# Patient Record
Sex: MALE | Race: WHITE | NOT HISPANIC OR LATINO | Employment: FULL TIME | URBAN - METROPOLITAN AREA
[De-identification: names, ages, dates, MRNs, and addresses within clinical notes are randomized per-mention and may not be internally consistent; named-entity substitution may affect disease eponyms.]

---

## 2020-11-25 ENCOUNTER — TRANSCRIBE ORDERS (OUTPATIENT)
Dept: ADMINISTRATIVE | Facility: HOSPITAL | Age: 40
End: 2020-11-25

## 2020-11-25 DIAGNOSIS — R74.8 ABNORMAL LEVELS OF OTHER SERUM ENZYMES: Primary | ICD-10-CM

## 2020-11-25 DIAGNOSIS — I10 HYPERTENSION: ICD-10-CM

## 2020-12-09 ENCOUNTER — HOSPITAL ENCOUNTER (OUTPATIENT)
Dept: RADIOLOGY | Facility: HOSPITAL | Age: 40
Discharge: HOME | End: 2020-12-09
Attending: INTERNAL MEDICINE
Payer: COMMERCIAL

## 2020-12-09 DIAGNOSIS — R74.8 ABNORMAL LEVELS OF OTHER SERUM ENZYMES: ICD-10-CM

## 2020-12-09 DIAGNOSIS — I10 HYPERTENSION: ICD-10-CM

## 2020-12-09 PROCEDURE — 76700 US EXAM ABDOM COMPLETE: CPT

## 2021-03-20 PROBLEM — E78.5 HYPERLIPIDEMIA: Status: ACTIVE | Noted: 2021-03-20

## 2021-03-20 PROBLEM — I10 HTN (HYPERTENSION): Status: ACTIVE | Noted: 2021-03-20

## 2021-03-20 PROBLEM — R07.9 CHEST PAIN: Status: ACTIVE | Noted: 2021-03-20

## 2021-03-20 PROBLEM — R74.8 ELEVATED LIVER ENZYMES: Status: ACTIVE | Noted: 2021-03-20

## 2021-03-20 PROBLEM — K21.9 GE REFLUX: Status: ACTIVE | Noted: 2021-03-20

## 2021-03-20 PROBLEM — R06.83 LOUD SNORING: Status: ACTIVE | Noted: 2021-03-20

## 2021-07-22 ENCOUNTER — OFFICE VISIT (OUTPATIENT)
Dept: GASTROENTEROLOGY | Facility: CLINIC | Age: 41
End: 2021-07-22
Payer: COMMERCIAL

## 2021-07-22 VITALS
HEIGHT: 71 IN | BODY MASS INDEX: 28.56 KG/M2 | WEIGHT: 204 LBS | DIASTOLIC BLOOD PRESSURE: 87 MMHG | HEART RATE: 59 BPM | SYSTOLIC BLOOD PRESSURE: 136 MMHG

## 2021-07-22 DIAGNOSIS — K76.0 FATTY LIVER: ICD-10-CM

## 2021-07-22 DIAGNOSIS — Z83.71 FAMILY HISTORY OF COLONIC POLYPS: Primary | ICD-10-CM

## 2021-07-22 DIAGNOSIS — K80.20 GALLSTONES: ICD-10-CM

## 2021-07-22 DIAGNOSIS — K21.9 GASTROESOPHAGEAL REFLUX DISEASE WITHOUT ESOPHAGITIS: ICD-10-CM

## 2021-07-22 DIAGNOSIS — Z80.0 FAMILY HISTORY OF COLON CANCER: ICD-10-CM

## 2021-07-22 PROCEDURE — 99204 OFFICE O/P NEW MOD 45 MIN: CPT | Performed by: INTERNAL MEDICINE
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
## 2021-10-08 ENCOUNTER — OFFICE VISIT (OUTPATIENT)
Dept: INTERNAL MEDICINE CLINIC | Facility: CLINIC | Age: 41
End: 2021-10-08
Payer: COMMERCIAL

## 2021-10-08 VITALS
HEART RATE: 81 BPM | TEMPERATURE: 98.3 F | SYSTOLIC BLOOD PRESSURE: 122 MMHG | WEIGHT: 203 LBS | DIASTOLIC BLOOD PRESSURE: 80 MMHG | BODY MASS INDEX: 28.31 KG/M2 | OXYGEN SATURATION: 97 %

## 2021-10-08 DIAGNOSIS — R53.83 OTHER FATIGUE: ICD-10-CM

## 2021-10-08 DIAGNOSIS — K80.20 GALLSTONES: ICD-10-CM

## 2021-10-08 DIAGNOSIS — K21.9 GASTROESOPHAGEAL REFLUX DISEASE WITHOUT ESOPHAGITIS: ICD-10-CM

## 2021-10-08 DIAGNOSIS — R06.83 LOUD SNORING: ICD-10-CM

## 2021-10-08 DIAGNOSIS — Z00.00 ANNUAL PHYSICAL EXAM: Primary | ICD-10-CM

## 2021-10-08 DIAGNOSIS — E78.2 MIXED HYPERLIPIDEMIA: ICD-10-CM

## 2021-10-08 DIAGNOSIS — R74.8 ELEVATED LIVER ENZYMES: ICD-10-CM

## 2021-10-08 PROCEDURE — 99213 OFFICE O/P EST LOW 20 MIN: CPT | Performed by: INTERNAL MEDICINE

## 2021-10-08 PROCEDURE — 3725F SCREEN DEPRESSION PERFORMED: CPT | Performed by: INTERNAL MEDICINE

## 2021-10-08 PROCEDURE — 99396 PREV VISIT EST AGE 40-64: CPT | Performed by: INTERNAL MEDICINE

## 2021-10-08 PROCEDURE — 1036F TOBACCO NON-USER: CPT | Performed by: INTERNAL MEDICINE

## 2021-10-08 RX ORDER — OMEGA-3-ACID ETHYL ESTERS 1 G/1
1 CAPSULE, LIQUID FILLED ORAL 2 TIMES DAILY
COMMUNITY

## 2021-10-08 RX ORDER — MELATONIN
1000 DAILY
COMMUNITY

## 2021-11-17 ENCOUNTER — TELEPHONE (OUTPATIENT)
Dept: GASTROENTEROLOGY | Facility: CLINIC | Age: 41
End: 2021-11-17

## 2022-03-08 ENCOUNTER — OFFICE VISIT (OUTPATIENT)
Dept: INTERNAL MEDICINE CLINIC | Facility: CLINIC | Age: 42
End: 2022-03-08
Payer: COMMERCIAL

## 2022-03-08 VITALS
HEART RATE: 63 BPM | TEMPERATURE: 98.3 F | OXYGEN SATURATION: 97 % | WEIGHT: 208 LBS | DIASTOLIC BLOOD PRESSURE: 82 MMHG | BODY MASS INDEX: 29.01 KG/M2 | SYSTOLIC BLOOD PRESSURE: 124 MMHG

## 2022-03-08 DIAGNOSIS — E78.2 MIXED HYPERLIPIDEMIA: Primary | ICD-10-CM

## 2022-03-08 DIAGNOSIS — R53.83 OTHER FATIGUE: ICD-10-CM

## 2022-03-08 DIAGNOSIS — R74.8 ELEVATED LIVER ENZYMES: ICD-10-CM

## 2022-03-08 DIAGNOSIS — K80.20 GALLSTONES: ICD-10-CM

## 2022-03-08 DIAGNOSIS — K76.0 FATTY LIVER: ICD-10-CM

## 2022-03-08 DIAGNOSIS — R06.83 LOUD SNORING: ICD-10-CM

## 2022-03-08 PROCEDURE — 99214 OFFICE O/P EST MOD 30 MIN: CPT | Performed by: INTERNAL MEDICINE

## 2022-03-08 NOTE — PROGRESS NOTES
Otoniel Collins Assessment/Plan:    1  Mixed hyperlipidemia  Assessment & Plan:  diet controlled  Cholesterol now 192, triglyceride within normal limit patient he had a blood test ordered by his insurance company  Patient has a blood test result on his cellphone patient was advised to call the lab if they can fax the blood test result to me to review  Meanwhile advised to continue low-cholesterol low saturated diet  2  Elevated liver enzymes  Assessment & Plan:  He recently had a blood test ordered by insurance company revealed a normal AST but ALT was 77 and has elevated GGT  His hepatitis C antibody was negative as well as hepatitis-B surface antigen was negative in the blood test   Patient is going to see gastroenterologist tomorrow  Most likely elevated liver enzymes secondary to fatty liver and hepatic steatosis    Has extensive workup in the past including hepatitis panel all were negative  He has a past history of infectious mononucleosis  Orders:  -     CBC and differential; Future    3  Loud snoring  Assessment & Plan:  Loud snoring at night with daytime fatigue and rule out sleep apnea  He was advised to see pulmonary specialist last time but he has not seen any pulmonary specialist yet  Will refer to pulmonary specialist for sleep apnea evaluation  Orders:  -     Ambulatory Referral to Pulmonology; Future    4  Other fatigue  Assessment & Plan:  Rule out sleep apnea versus other etiology  Will check CBC and vitamin-D level  He had as CMP which I reviewed  Had a TSH last year was within normal limit    Orders:  -     CBC and differential; Future  -     Vitamin D 25 hydroxy; Future  -     Ambulatory Referral to Pulmonology; Future    5  Gallstones  Assessment & Plan:  Presently he is asymptomatic  Discussed with the patient symptoms of the gallstones  Discussed with the patient surgical option but would like to wait      6  BMI 29 0-29 9,adult  Assessment & Plan:  Patient  was advised to decrease portion size  Advised to decrease carb, sugar, cholesterol intake  Advised to exercise 3-5 times per week  Advised to lose weight  7  Fatty liver  Assessment & Plan:  Had a last ultrasound December 2020 revealed hepatic steatosis  Patient advised to watch diet and lose weight  Patient is going to see tomorrow GI specialist      BMI Counseling: Body mass index is 29 01 kg/m²  The BMI is above normal  Nutrition recommendations include decreasing portion sizes, decreasing fast food intake, consuming healthier snacks, limiting drinks that contain sugar, moderation in carbohydrate intake, reducing intake of saturated and trans fat and reducing intake of cholesterol  Exercise recommendations include exercising 3-5 times per week  No pharmacotherapy was ordered  Rationale for BMI follow-up plan is due to patient being overweight or obese            Subjective: Patient presents for follow-up      Patient ID: Norman Hdz is a 39 y o  male  HPI  59-year-old white male patient presents for follow-up of his medical problems  He denies any chest pain, shortness of breath, pain abdomen  Denies any cough, fever, chills  Denies any nausea vomiting, diarrhea  Patient had a blood test done by his insurance company and got the result of his blood test   Has elevated ALT and GGT on the blood test     The following portions of the patient's history were reviewed and updated as appropriate:     Past Medical History:  He has a past medical history of BMI 28 0-28 9,adult (10/8/2021), BMI 29 0-29 9,adult (3/8/2022), Chest pain, Elevated liver enzymes (03/20/2021), Fatigue (10/8/2021), GE reflux, HTN (hypertension) (03/20/2021), Hyperlipidemia (03/20/2021), Hypertension, Loud snoring (03/20/2021), and Skin lesion  ,  _______________________________________________________________________  Past Surgical History:   has a past surgical history that includes Finger surgery  ,  _______________________________________________________________________  Family History:  family history includes Diabetes in his brother and father; Heart disease in his maternal grandmother and paternal grandmother; No Known Problems in his mother ,  _______________________________________________________________________  Social History:   reports that he has never smoked  He has never used smokeless tobacco  He reports current alcohol use  He reports that he does not use drugs  ,  _______________________________________________________________________  Allergies:  has No Known Allergies     _______________________________________________________________________  Current Outpatient Medications   Medication Sig Dispense Refill    cholecalciferol (VITAMIN D3) 1,000 units tablet Take 1,000 Units by mouth daily Pt takes 5,000 iu      omega-3-acid ethyl esters (LOVAZA) 1 g capsule Take 1 g by mouth 2 (two) times a day No current facility-administered medications for this visit      _______________________________________________________________________  Review of Systems   Constitutional: Negative for chills, fatigue and fever  HENT: Negative for congestion, ear pain, hearing loss, nosebleeds, sinus pain, sore throat and trouble swallowing  Eyes: Negative for discharge, redness and visual disturbance  Respiratory: Negative for cough, chest tightness and shortness of breath  Cardiovascular: Negative for chest pain and palpitations  Gastrointestinal: Negative for abdominal pain, blood in stool, constipation, diarrhea, nausea and vomiting  Genitourinary: Negative for dysuria, flank pain, frequency and hematuria  Musculoskeletal: Negative for arthralgias, myalgias and neck pain  Skin: Negative for color change and rash  Neurological: Negative for dizziness, speech difficulty, weakness and headaches  Hematological: Does not bruise/bleed easily  Psychiatric/Behavioral: Negative for agitation and behavioral problems  Objective:  Vitals:    03/08/22 0906   BP: 124/82   BP Location: Left arm   Patient Position: Sitting   Cuff Size: Adult   Pulse: 63   Temp: 98 3 °F (36 8 °C)   TempSrc: Tympanic   SpO2: 97%   Weight: 94 3 kg (208 lb)     Body mass index is 29 01 kg/m²  Physical Exam  Vitals and nursing note reviewed  Constitutional:       General: He is not in acute distress  Appearance: Normal appearance  He is normal weight  HENT:      Head: Normocephalic and atraumatic  Right Ear: Ear canal and external ear normal       Left Ear: Ear canal and external ear normal       Nose:      Comments: Patient has a face mask on     Mouth/Throat:      Comments: Patient has a face mask on  Eyes:      General: No scleral icterus  Right eye: No discharge  Left eye: No discharge  Extraocular Movements: Extraocular movements intact        Conjunctiva/sclera: Conjunctivae normal  Cardiovascular:      Rate and Rhythm: Normal rate and regular rhythm  Pulses: Normal pulses  Heart sounds: No murmur heard  Pulmonary:      Effort: Pulmonary effort is normal  No respiratory distress  Breath sounds: Normal breath sounds  Abdominal:      General: Bowel sounds are normal       Palpations: Abdomen is soft  There is no mass  Tenderness: There is no abdominal tenderness  Musculoskeletal:         General: Normal range of motion  Cervical back: Normal range of motion and neck supple  Right lower leg: No edema  Left lower leg: No edema  Skin:     General: Skin is warm  Findings: No rash  Neurological:      General: No focal deficit present  Mental Status: He is alert and oriented to person, place, and time  Motor: No weakness  Coordination: Coordination normal    Psychiatric:         Mood and Affect: Mood normal          Behavior: Behavior normal        I spent 30 minutes with the patient today    More than 50% time spent for reviewing of external notes, reviewing of the results of diagnostics test, management of care, patient education and ordering of test

## 2022-03-08 NOTE — PATIENT INSTRUCTIONS
Patient was advised to continue present medications  discussed with the patient medications and laboratory data in detail  Follow-up with me  as advised  If any blood test was ordered please do 1 week prior to next appointment unless advise to get earlier    If you have any questions please call the office [phone]

## 2022-03-09 ENCOUNTER — OFFICE VISIT (OUTPATIENT)
Dept: GASTROENTEROLOGY | Facility: CLINIC | Age: 42
End: 2022-03-09
Payer: COMMERCIAL

## 2022-03-09 VITALS
WEIGHT: 208 LBS | HEART RATE: 58 BPM | SYSTOLIC BLOOD PRESSURE: 148 MMHG | BODY MASS INDEX: 29.12 KG/M2 | HEIGHT: 71 IN | DIASTOLIC BLOOD PRESSURE: 90 MMHG

## 2022-03-09 DIAGNOSIS — R79.89 ELEVATED LFTS: Primary | ICD-10-CM

## 2022-03-09 PROCEDURE — 99214 OFFICE O/P EST MOD 30 MIN: CPT | Performed by: PHYSICIAN ASSISTANT

## 2022-03-09 PROCEDURE — 1036F TOBACCO NON-USER: CPT | Performed by: PHYSICIAN ASSISTANT

## 2022-03-09 PROCEDURE — 3008F BODY MASS INDEX DOCD: CPT | Performed by: PHYSICIAN ASSISTANT

## 2022-03-09 NOTE — ASSESSMENT & PLAN NOTE
Had a last ultrasound December 2020 revealed hepatic steatosis  Patient advised to watch diet and lose weight    Patient is going to see tomorrow GI specialist

## 2022-03-09 NOTE — ASSESSMENT & PLAN NOTE
He recently had a blood test ordered by insurance company revealed a normal AST but ALT was 77 and has elevated GGT  His hepatitis C antibody was negative as well as hepatitis-B surface antigen was negative in the blood test   Patient is going to see gastroenterologist tomorrow  Most likely elevated liver enzymes secondary to fatty liver and hepatic steatosis  Has extensive workup in the past including hepatitis panel all were negative  He has a past history of infectious mononucleosis

## 2022-03-09 NOTE — ASSESSMENT & PLAN NOTE
Presently he is asymptomatic  Discussed with the patient symptoms of the gallstones    Discussed with the patient surgical option but would like to wait

## 2022-03-09 NOTE — ASSESSMENT & PLAN NOTE
diet controlled  Cholesterol now 192, triglyceride within normal limit patient he had a blood test ordered by his insurance company  Patient has a blood test result on his cellphone patient was advised to call the lab if they can fax the blood test result to me to review  Meanwhile advised to continue low-cholesterol low saturated diet

## 2022-03-09 NOTE — ASSESSMENT & PLAN NOTE
Loud snoring at night with daytime fatigue and rule out sleep apnea  He was advised to see pulmonary specialist last time but he has not seen any pulmonary specialist yet  Will refer to pulmonary specialist for sleep apnea evaluation
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
## 2022-03-09 NOTE — ASSESSMENT & PLAN NOTE
Rule out sleep apnea versus other etiology  Will check CBC and vitamin-D level  He had as CMP which I reviewed    Had a TSH last year was within normal limit

## 2022-03-10 LAB
25(OH)D3 SERPL-MCNC: 22 NG/ML (ref 30–100)
BASOPHILS # BLD AUTO: 28 CELLS/UL (ref 0–200)
BASOPHILS NFR BLD AUTO: 0.5 %
EOSINOPHIL # BLD AUTO: 61 CELLS/UL (ref 15–500)
EOSINOPHIL NFR BLD AUTO: 1.1 %
ERYTHROCYTE [DISTWIDTH] IN BLOOD BY AUTOMATED COUNT: 12.5 % (ref 11–15)
HCT VFR BLD AUTO: 42.8 % (ref 38.5–50)
HGB BLD-MCNC: 14.9 G/DL (ref 13.2–17.1)
LYMPHOCYTES # BLD AUTO: 1150 CELLS/UL (ref 850–3900)
LYMPHOCYTES NFR BLD AUTO: 20.9 %
MCH RBC QN AUTO: 30.6 PG (ref 27–33)
MCHC RBC AUTO-ENTMCNC: 34.8 G/DL (ref 32–36)
MCV RBC AUTO: 87.9 FL (ref 80–100)
MONOCYTES # BLD AUTO: 479 CELLS/UL (ref 200–950)
MONOCYTES NFR BLD AUTO: 8.7 %
NEUTROPHILS # BLD AUTO: 3784 CELLS/UL (ref 1500–7800)
NEUTROPHILS NFR BLD AUTO: 68.8 %
PLATELET # BLD AUTO: 189 THOUSAND/UL (ref 140–400)
PMV BLD REES-ECKER: 9.8 FL (ref 7.5–12.5)
RBC # BLD AUTO: 4.87 MILLION/UL (ref 4.2–5.8)
WBC # BLD AUTO: 5.5 THOUSAND/UL (ref 3.8–10.8)

## 2022-03-18 ENCOUNTER — TELEPHONE (OUTPATIENT)
Dept: GASTROENTEROLOGY | Facility: CLINIC | Age: 42
End: 2022-03-18

## 2022-03-18 ENCOUNTER — HOSPITAL ENCOUNTER (OUTPATIENT)
Dept: ULTRASOUND IMAGING | Facility: HOSPITAL | Age: 42
Discharge: HOME | End: 2022-03-18
Payer: COMMERCIAL

## 2022-03-18 DIAGNOSIS — R79.89 ELEVATED LFTS: ICD-10-CM

## 2022-03-18 PROCEDURE — 76705 ECHO EXAM OF ABDOMEN: CPT

## 2022-03-18 NOTE — TELEPHONE ENCOUNTER
Your patient called to go over labs  Talk to patient on phone reviewed results of lab work  Patient instructed he should get vaccine for hepatitis B because he has no immunity  Patient was told to follow-up with rheumatologist SHIVA positive homogenous pattern suggest systemic lupus or arthritis  Patient was concerned because his AST and ALT did go up  I do not have the prior lab work in our system to come  He has it on his phone he said previous AST 39 now 49 previous ALT 77 now 100   now 294  [id number] level slightly decreased 75  Ultrasound of abdomen still pending

## 2022-03-18 NOTE — TELEPHONE ENCOUNTER
Patient called and left a message on the Nurse line asking for a call back regarding recent lab work results  He states that he does have an upcomming appointment and hed like to know if he needs to come in sooner due to his results  The dr has not reviewed these results yet (external order panel dated 03/10/2022)       Please Advise- Demetrio Dong

## 2022-04-11 ENCOUNTER — OFFICE VISIT (OUTPATIENT)
Dept: PULMONOLOGY | Facility: MEDICAL CENTER | Age: 42
End: 2022-04-11
Payer: COMMERCIAL

## 2022-04-11 VITALS
HEIGHT: 71 IN | BODY MASS INDEX: 28.56 KG/M2 | OXYGEN SATURATION: 98 % | WEIGHT: 204 LBS | TEMPERATURE: 98.6 F | DIASTOLIC BLOOD PRESSURE: 70 MMHG | HEART RATE: 70 BPM | SYSTOLIC BLOOD PRESSURE: 120 MMHG | RESPIRATION RATE: 12 BRPM

## 2022-04-11 DIAGNOSIS — R06.83 LOUD SNORING: Primary | ICD-10-CM

## 2022-04-11 DIAGNOSIS — R53.83 OTHER FATIGUE: ICD-10-CM

## 2022-04-11 DIAGNOSIS — J31.0 NONALLERGIC RHINITIS: ICD-10-CM

## 2022-04-11 PROCEDURE — 3008F BODY MASS INDEX DOCD: CPT | Performed by: INTERNAL MEDICINE

## 2022-04-11 PROCEDURE — 99243 OFF/OP CNSLTJ NEW/EST LOW 30: CPT | Performed by: INTERNAL MEDICINE

## 2022-04-11 PROCEDURE — 1036F TOBACCO NON-USER: CPT | Performed by: INTERNAL MEDICINE

## 2022-04-11 RX ORDER — FLUTICASONE PROPIONATE 50 MCG
2 SPRAY, SUSPENSION (ML) NASAL
Qty: 16 G | Refills: 7 | Status: SHIPPED | OUTPATIENT
Start: 2022-04-11

## 2022-04-11 NOTE — ASSESSMENT & PLAN NOTE
Does have loud snoring, daytime fatigue and is overweight  I did talk to him about the possibility of MARGARITA  Also discussed treatment of MARGARITA with him  I did order home sleep study and he was agreeable to this    Possibly he could have underlying MARGARITA

## 2022-04-11 NOTE — ASSESSMENT & PLAN NOTE
I did recommend he try Flonase nasal spray 2 sprays each nostril bedtime to see if this helps with the snoring    He has tried breathe right nasal strips with no improvement

## 2022-04-11 NOTE — ASSESSMENT & PLAN NOTE
Possibly related to MARGARITA    He also only sleeps about 6 hours per night so I told try to maximize the amount of sleep time he gets at night
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
## 2022-08-22 ENCOUNTER — TELEPHONE (OUTPATIENT)
Dept: INTERNAL MEDICINE CLINIC | Facility: CLINIC | Age: 42
End: 2022-08-22

## 2022-08-22 DIAGNOSIS — R50.9 FEVER, UNSPECIFIED FEVER CAUSE: Primary | ICD-10-CM

## 2022-08-22 DIAGNOSIS — Z20.822 EXPOSURE TO COVID-19 VIRUS: ICD-10-CM

## 2022-08-22 DIAGNOSIS — R19.7 DIARRHEA, UNSPECIFIED TYPE: ICD-10-CM

## 2022-08-22 DIAGNOSIS — R52 BODY ACHES: ICD-10-CM

## 2022-08-22 NOTE — TELEPHONE ENCOUNTER
Since yesterday, chills, body aches, headache, diarrhea, fever - was exposed to his neighbor last week who has covid - would like a PCR test to make sure he has it - he took a home test and it was negative  He would like to get the Paxlovid asap if he is positive  He lives in [place] and wants to go to [facility] - they do PCR's - can we fax the order to the pharmacy?     Fax number is [phone]

## 2022-08-22 NOTE — TELEPHONE ENCOUNTER
patient wife called back to let you know that she will be taking her  to the care now in [address] for Evonne testing  Can you please enter a script based on the previous note from Hubert with symptoms

## 2022-09-06 ENCOUNTER — TELEPHONE (OUTPATIENT)
Dept: INTERNAL MEDICINE CLINIC | Facility: CLINIC | Age: 42
End: 2022-09-06

## 2022-09-06 ENCOUNTER — OFFICE VISIT (OUTPATIENT)
Dept: INTERNAL MEDICINE CLINIC | Facility: CLINIC | Age: 42
End: 2022-09-06
Payer: COMMERCIAL

## 2022-09-06 VITALS
TEMPERATURE: 98.4 F | BODY MASS INDEX: 29.03 KG/M2 | HEIGHT: 69 IN | WEIGHT: 196 LBS | SYSTOLIC BLOOD PRESSURE: 122 MMHG | HEART RATE: 88 BPM | OXYGEN SATURATION: 97 % | DIASTOLIC BLOOD PRESSURE: 80 MMHG | RESPIRATION RATE: 14 BRPM

## 2022-09-06 DIAGNOSIS — E55.9 VITAMIN D DEFICIENCY: ICD-10-CM

## 2022-09-06 DIAGNOSIS — E78.2 MIXED HYPERLIPIDEMIA: ICD-10-CM

## 2022-09-06 DIAGNOSIS — R74.8 ELEVATED LIVER ENZYMES: ICD-10-CM

## 2022-09-06 DIAGNOSIS — R19.7 DIARRHEA, UNSPECIFIED TYPE: Primary | ICD-10-CM

## 2022-09-06 DIAGNOSIS — R06.83 LOUD SNORING: ICD-10-CM

## 2022-09-06 PROCEDURE — 99214 OFFICE O/P EST MOD 30 MIN: CPT | Performed by: INTERNAL MEDICINE

## 2022-09-06 NOTE — ASSESSMENT & PLAN NOTE
Diet controlled  Cholesterol 192  Patient state he has been watching diet very closely  Will follow lipid panel

## 2022-09-06 NOTE — ASSESSMENT & PLAN NOTE
His vitamin-D level was low 22 last time  He has been taking vitamin-D 5000 International Units daily

## 2022-09-06 NOTE — PATIENT INSTRUCTIONS
Patient was advised to continue present medications  discussed with the patient medications and laboratory data in detail  Follow-up with me  as advised  If any blood test was ordered please do 1 week prior to next appointment unless advise to get earlier    If you have any questions please call the office [phone]

## 2022-09-06 NOTE — TELEPHONE ENCOUNTER
Was sick with fever/chills and diarrhea on 8/20-22 - everything except the diarrhea has gone away - is covid negative  He has not taking any OTC meds  He is wondering if he needs to be seen      Uses [facility]

## 2022-09-06 NOTE — TELEPHONE ENCOUNTER
I spoke with Calvin Sacks he said he is not sure he can come here tomorrow - he ay have to  his son at that time - I did tell him he can try some Imodium for the symptoms - he is going to call me back when he sees what time he must  his son

## 2022-09-06 NOTE — ASSESSMENT & PLAN NOTE
He was seen by GI specialist   Had a workup including ultrasound of the liver  Most likely secondary to fatty liver/hepatic steatosis  Will follow liver enzymes  Advised to lose weight exercise

## 2022-09-06 NOTE — ASSESSMENT & PLAN NOTE
Rule out viral versus bacterial infection versus colitis versus metabolic  Will order metabolic workup, stool studies  Meanwhile advised to avoid dairy products  Increase fluids  Start Imodium 2 mg tablet 1 tablet after each loose bowel movements maximum 5 tablets per day  Start probiotic 1 a day

## 2022-09-06 NOTE — TELEPHONE ENCOUNTER
We had a cancellation for 3 pm so I called him back and he can come today - so he is on your sched for 3 PM

## 2022-09-06 NOTE — PROGRESS NOTES
Assessment/Plan:    1  Diarrhea, unspecified type  Assessment & Plan:  Rule out viral versus bacterial infection versus colitis versus metabolic  Will order metabolic workup, stool studies  Meanwhile advised to avoid dairy products  Increase fluids  Start Imodium 2 mg tablet 1 tablet after each loose bowel movements maximum 5 tablets per day  Start probiotic 1 a day  Orders:  -     CBC and differential; Future  -     Comprehensive metabolic panel; Future  -     TSH, 3rd generation; Future  -     Stool culture; Future  -     White Blood Cells, Stool by Gram Stain; Future  -     Ova and parasite examination; Future  -     Clostridium difficile toxin by PCR; Future    2  Elevated liver enzymes  Assessment & Plan:  He was seen by GI specialist   Had a workup including ultrasound of the liver  Most likely secondary to fatty liver/hepatic steatosis  Will follow liver enzymes  Advised to lose weight exercise  Orders:  -     Comprehensive metabolic panel; Future    3  Mixed hyperlipidemia  Assessment & Plan:  Diet controlled  Cholesterol 192  Patient state he has been watching diet very closely  Will follow lipid panel  Orders:  -     TSH, 3rd generation; Future  -     Lipid panel; Future    4  BMI 28 0-28 9,adult  Assessment & Plan:  Patient  was advised to decrease portion size  Advised to decrease carb, sugar, cholesterol intake  Advised to exercise 3-5 times per week  Advised to lose weight  5  Loud snoring  Assessment & Plan:  States was seen by specialist got home kit to check for sleep apnea  but did not check    Orders:  -     TSH, 3rd generation; Future    6  Vitamin D deficiency  Assessment & Plan:  His vitamin-D level was low 22 last time  He has been taking vitamin-D 5000 International Units daily  Subjective:  Patient presents with complaint of diarrhea  Patient ID: Nara Angulo is a 43 y o  male      HPI   60-year-old white male patient initially started with the fever, chills, diarrhea since August 20,2022  His fever chills resolved  But has a persistent diarrhea  he has 8-10 loose BMs per day  Although denies any nausea, vomiting, pain in abdomen  Denies blood in the stool or dark colored stool  Nobody sick at home  He checked t test for the COVID-19 which was negative  Denies any cough sore throat  Denies chest pain shortness  of breath  He did not try any medication for diarrhea yet  Stool mainly yellow discoloration denies recent travel outside of the area  Denies taking any antibiotic in last 2-3 months  The following portions of the patient's history were reviewed and updated as appropriate:     Past Medical History:  He has a past medical history of BMI 28 0-28 9,adult (10/8/2021), BMI 29 0-29 9,adult (3/8/2022), Chest pain, Diarrhea (9/6/2022), Elevated liver enzymes (03/20/2021), Fatigue (10/8/2021), GE reflux, HTN (hypertension) (03/20/2021), Hyperlipidemia (03/20/2021), Hypertension, Loud snoring (03/20/2021), Skin lesion, and Vitamin D deficiency (9/6/2022)  ,  _______________________________________________________________________  Past Surgical History:   has a past surgical history that includes Finger surgery and Undescended testicle exploration  ,  _______________________________________________________________________  Family History:  family history includes Diabetes in his brother and father; Heart disease in his maternal grandmother and paternal grandmother; No Known Problems in his mother ,  _______________________________________________________________________  Social History:   reports that he has never smoked  He has never used smokeless tobacco  He reports current alcohol use  He reports that he does not use drugs  ,  _______________________________________________________________________  Allergies:  has No Known Allergies     _______________________________________________________________________  Current Outpatient Medications Medication Sig Dispense Refill    cholecalciferol (VITAMIN D3) 1,000 units tablet Take 5,000 Units by mouth daily Pt takes 5,000 iu        fluticasone (FLONASE) 50 mcg/act nasal spray 2 sprays into each nostril daily at bedtime as needed for rhinitis 16 g 7     No current facility-administered medications for this visit      _______________________________________________________________________  Review of Systems   Constitutional: Negative for chills and fever  HENT: Negative for congestion, ear pain, hearing loss, nosebleeds, sinus pain, sore throat and trouble swallowing  Eyes: Negative for discharge, redness and visual disturbance  Respiratory: Negative for cough, chest tightness and shortness of breath  Cardiovascular: Negative for chest pain and palpitations  Gastrointestinal: Positive for diarrhea  Negative for abdominal pain, blood in stool, constipation, nausea and vomiting  Genitourinary: Negative for dysuria, flank pain, frequency and hematuria  Musculoskeletal: Negative for arthralgias, myalgias and neck pain  Skin: Negative for color change and rash  Neurological: Negative for dizziness, speech difficulty, weakness and headaches  Hematological: Does not bruise/bleed easily  Psychiatric/Behavioral: Negative for agitation and behavioral problems  Objective:  Vitals:    09/06/22 1455   BP: 122/80   BP Location: Left arm   Patient Position: Sitting   Cuff Size: Adult   Pulse: 88   Resp: 14   Temp: 98 4 °F (36 9 °C)   TempSrc: Tympanic   SpO2: 97%   Weight: 88 9 kg (196 lb)   Height: 5' 9" (1 753 m)     Body mass index is 28 94 kg/m²  Physical Exam  Vitals and nursing note reviewed  Constitutional:       General: He is not in acute distress  Appearance: Normal appearance  HENT:      Head: Normocephalic and atraumatic        Right Ear: Ear canal and external ear normal       Left Ear: Ear canal and external ear normal       Nose: Nose normal       Mouth/Throat: Mouth: Mucous membranes are moist    Eyes:      General: No scleral icterus  Right eye: No discharge  Left eye: No discharge  Extraocular Movements: Extraocular movements intact  Conjunctiva/sclera: Conjunctivae normal    Cardiovascular:      Rate and Rhythm: Normal rate and regular rhythm  Pulses: Normal pulses  Heart sounds: No murmur heard  Pulmonary:      Effort: Pulmonary effort is normal  No respiratory distress  Breath sounds: Normal breath sounds  Abdominal:      General: Bowel sounds are normal  There is no distension  Palpations: Abdomen is soft  There is no mass  Tenderness: There is no abdominal tenderness  There is no guarding or rebound  Musculoskeletal:         General: Normal range of motion  Cervical back: Normal range of motion and neck supple  No muscular tenderness  Right lower leg: No edema  Left lower leg: No edema  Skin:     General: Skin is warm  Findings: No rash  Neurological:      General: No focal deficit present  Mental Status: He is alert and oriented to person, place, and time  Motor: No weakness  Coordination: Coordination normal    Psychiatric:         Mood and Affect: Mood normal          Behavior: Behavior normal          I spent 30 minutes with the patient today    More than 50% time spent for reviewing of external notes, reviewing of the results of diagnostics test, management of care, patient education and ordering of test

## 2022-09-07 LAB
ALBUMIN SERPL-MCNC: 4 G/DL (ref 3.6–5.1)
ALBUMIN/GLOB SERPL: 1.5 (CALC) (ref 1–2.5)
ALP SERPL-CCNC: 156 U/L (ref 36–130)
ALT SERPL-CCNC: 38 U/L (ref 9–46)
AST SERPL-CCNC: 25 U/L (ref 10–40)
BASOPHILS # BLD AUTO: 29 CELLS/UL (ref 0–200)
BASOPHILS NFR BLD AUTO: 0.6 %
BILIRUB SERPL-MCNC: 0.6 MG/DL (ref 0.2–1.2)
BUN SERPL-MCNC: 10 MG/DL (ref 7–25)
BUN/CREAT SERPL: ABNORMAL (CALC) (ref 6–22)
CALCIUM SERPL-MCNC: 9.2 MG/DL (ref 8.6–10.3)
CHLORIDE SERPL-SCNC: 103 MMOL/L (ref 98–110)
CHOLEST SERPL-MCNC: 148 MG/DL
CHOLEST/HDLC SERPL: 3.3 (CALC)
CO2 SERPL-SCNC: 31 MMOL/L (ref 20–32)
CREAT SERPL-MCNC: 0.92 MG/DL (ref 0.6–1.29)
EOSINOPHIL # BLD AUTO: 48 CELLS/UL (ref 15–500)
EOSINOPHIL NFR BLD AUTO: 1 %
ERYTHROCYTE [DISTWIDTH] IN BLOOD BY AUTOMATED COUNT: 12.4 % (ref 11–15)
GFR/BSA.PRED SERPLBLD CYS-BASED-ARV: 107 ML/MIN/1.73M2
GLOBULIN SER CALC-MCNC: 2.6 G/DL (CALC) (ref 1.9–3.7)
GLUCOSE SERPL-MCNC: 91 MG/DL (ref 65–99)
HCT VFR BLD AUTO: 38 % (ref 38.5–50)
HDLC SERPL-MCNC: 45 MG/DL
HGB BLD-MCNC: 13 G/DL (ref 13.2–17.1)
LDLC SERPL CALC-MCNC: 83 MG/DL (CALC)
LYMPHOCYTES # BLD AUTO: 1579 CELLS/UL (ref 850–3900)
LYMPHOCYTES NFR BLD AUTO: 32.9 %
MCH RBC QN AUTO: 30.7 PG (ref 27–33)
MCHC RBC AUTO-ENTMCNC: 34.2 G/DL (ref 32–36)
MCV RBC AUTO: 89.8 FL (ref 80–100)
MONOCYTES # BLD AUTO: 475 CELLS/UL (ref 200–950)
MONOCYTES NFR BLD AUTO: 9.9 %
NEUTROPHILS # BLD AUTO: 2669 CELLS/UL (ref 1500–7800)
NEUTROPHILS NFR BLD AUTO: 55.6 %
NONHDLC SERPL-MCNC: 103 MG/DL (CALC)
PLATELET # BLD AUTO: 324 THOUSAND/UL (ref 140–400)
PMV BLD REES-ECKER: 9.7 FL (ref 7.5–12.5)
POTASSIUM SERPL-SCNC: 4.3 MMOL/L (ref 3.5–5.3)
PROT SERPL-MCNC: 6.6 G/DL (ref 6.1–8.1)
RBC # BLD AUTO: 4.23 MILLION/UL (ref 4.2–5.8)
SODIUM SERPL-SCNC: 140 MMOL/L (ref 135–146)
TRIGL SERPL-MCNC: 106 MG/DL
TSH SERPL-ACNC: 0.5 MIU/L (ref 0.4–4.5)
WBC # BLD AUTO: 4.8 THOUSAND/UL (ref 3.8–10.8)

## 2022-09-10 LAB — C DIFF TOX GENS STL QL NAA+PROBE: NOT DETECTED

## 2022-09-12 ENCOUNTER — TELEPHONE (OUTPATIENT)
Dept: INTERNAL MEDICINE CLINIC | Facility: CLINIC | Age: 42
End: 2022-09-12

## 2022-09-12 DIAGNOSIS — A09 GASTROENTERITIS, INFECTIOUS: Primary | ICD-10-CM

## 2022-09-12 RX ORDER — AZITHROMYCIN 500 MG/1
500 TABLET, FILM COATED ORAL DAILY
Qty: 3 TABLET | Refills: 0 | Status: SHIPPED | OUTPATIENT
Start: 2022-09-12 | End: 2022-09-15

## 2022-09-13 ENCOUNTER — OFFICE VISIT (OUTPATIENT)
Dept: INTERNAL MEDICINE CLINIC | Facility: CLINIC | Age: 42
End: 2022-09-13
Payer: COMMERCIAL

## 2022-09-13 VITALS
HEART RATE: 62 BPM | HEIGHT: 70 IN | OXYGEN SATURATION: 97 % | TEMPERATURE: 98.3 F | WEIGHT: 200 LBS | BODY MASS INDEX: 28.63 KG/M2 | DIASTOLIC BLOOD PRESSURE: 80 MMHG | RESPIRATION RATE: 14 BRPM | SYSTOLIC BLOOD PRESSURE: 122 MMHG

## 2022-09-13 DIAGNOSIS — A09 DIARRHEA OF INFECTIOUS ORIGIN: Primary | ICD-10-CM

## 2022-09-13 DIAGNOSIS — E55.9 VITAMIN D DEFICIENCY: ICD-10-CM

## 2022-09-13 DIAGNOSIS — R74.8 ELEVATED LIVER ENZYMES: ICD-10-CM

## 2022-09-13 DIAGNOSIS — D64.89 ANEMIA DUE TO OTHER CAUSE, NOT CLASSIFIED: ICD-10-CM

## 2022-09-13 DIAGNOSIS — R53.83 OTHER FATIGUE: ICD-10-CM

## 2022-09-13 DIAGNOSIS — E78.2 MIXED HYPERLIPIDEMIA: ICD-10-CM

## 2022-09-13 PROCEDURE — 99213 OFFICE O/P EST LOW 20 MIN: CPT | Performed by: INTERNAL MEDICINE

## 2022-09-13 NOTE — PROGRESS NOTES
Assessment/Plan:    1  Diarrhea of infectious origin  Assessment & Plan:  Patient took some Imodium with some improvement in diarrhea  Has about 3-4 bowel movements per day but now is getting more softer than liquid  Campylobacter positive started on azithromycin yesterday  Patient to see GI specialist September 21, 2022        2  Vitamin D deficiency  Assessment & Plan:  Last vitamin-D levels 22  He has been taking vitamin-D 5000 International Units daily  Will follow vitamin-D level and advise accordingly  Orders:  -     Vitamin D 25 hydroxy; Future    3  Anemia due to other cause, not classified  Assessment & Plan:  His hemoglobin was 14 9 March 2022 and now decreased to 13 0  Patient denies any GI symptoms except diarrhea  Will check anemia workup with follow up CBC  Orders:  -     CBC and differential; Future  -     Ferritin; Future  -     Folate; Future  -     Vitamin B12; Future  -     Iron Panel (Includes Ferritin, Iron Sat%, Iron, and TIBC); Future    4  Mixed hyperlipidemia  Assessment & Plan:  Well controlled on diet  Cholesterol 148, triglyceride 106, HDL 45, LDL 83 so advised to continue low-cholesterol low saturated diet      5  Elevated liver enzymes  Assessment & Plan:  Liver enzymes back to normal   Has a slightly elevated alkaline phosphatase  Patient has been followed by GI specialist   Has appointment to see GI specialist on September 21, 2022        6  BMI 28 0-28 9,adult    7  Other fatigue  -     CBC and differential; Future  -     Ferritin; Future  -     Folate; Future  -     Vitamin B12; Future  -     Iron Panel (Includes Ferritin, Iron Sat%, Iron, and TIBC); Future  -     Testosterone; Future          Subjective:  Patient presents for follow-up  Patient ID: Ayaka Bautista is a 43 y o  male  HPI   49-year-old white male patient presents for follow-up of his medical problems  Patient denies any chest pain, shortness of breath, pain in abdomen  Denies any nausea vomiting  Diarrhea is improving but still has a 3-4 BMs per day  Started on azithromycin yesterday for Campylobacter infection  Denies blood in the stool or dark colored stool  Complain of feeling tired  Denies cough fever chills sore throat  He started probiotic as well as takes Imodium p r n  [place] Patient to see GI specialist as scheduled 09/21/2022  The following portions of the patient's history were reviewed and updated as appropriate:     Past Medical History:  He has a past medical history of BMI 28 0-28 9,adult (10/8/2021), BMI 29 0-29 9,adult (3/8/2022), Chest pain, Diarrhea (9/6/2022), Elevated liver enzymes (03/20/2021), Fatigue (10/8/2021), GE reflux, HTN (hypertension) (03/20/2021), Hyperlipidemia (03/20/2021), Hypertension, Loud snoring (03/20/2021), Other fatigue (9/13/2022), Other specified anemias (9/13/2022), Skin lesion, and Vitamin D deficiency (9/6/2022)  ,  _______________________________________________________________________  Past Surgical History:   has a past surgical history that includes Finger surgery and Undescended testicle exploration  ,  _______________________________________________________________________  Family History:  family history includes Diabetes in his brother and father; Heart disease in his maternal grandmother and paternal grandmother; No Known Problems in his mother ,  _______________________________________________________________________  Social History:   reports that he has never smoked  He has never used smokeless tobacco  He reports current alcohol use  He reports that he does not use drugs  ,  _______________________________________________________________________  Allergies:  has No Known Allergies     _______________________________________________________________________  Current Outpatient Medications   Medication Sig Dispense Refill    azithromycin (ZITHROMAX) 500 MG tablet Take 1 tablet (500 mg total) by mouth daily for 3 days 3 tablet 0    cholecalciferol (VITAMIN D3) 1,000 units tablet Take 5,000 Units by mouth daily Pt takes 5,000 iu        fluticasone (FLONASE) 50 mcg/act nasal spray 2 sprays into each nostril daily at bedtime as needed for rhinitis 16 g 7    Probiotic Product (PROBIOTIC-10 PO) Take by mouth       No current facility-administered medications for this visit      _______________________________________________________________________  Review of Systems   Constitutional: Positive for fatigue  Negative for chills and fever  HENT: Negative for congestion, ear pain, hearing loss, nosebleeds, sinus pain, sore throat and trouble swallowing  Eyes: Negative for discharge, redness and visual disturbance  Respiratory: Negative for cough, chest tightness and shortness of breath  Cardiovascular: Negative for chest pain and palpitations  Gastrointestinal: Positive for diarrhea (But improving now has a softer bowel movements then liquidyc )  Negative for abdominal pain, blood in stool, constipation, nausea and vomiting  Genitourinary: Negative for dysuria, flank pain, frequency and hematuria  Musculoskeletal: Negative for arthralgias, myalgias and neck pain  Skin: Negative for color change and rash  Neurological: Negative for dizziness, speech difficulty, weakness and headaches  Hematological: Does not bruise/bleed easily  Psychiatric/Behavioral: Negative for agitation and behavioral problems  Objective:  Vitals:    09/13/22 0845   BP: 122/80   BP Location: Right arm   Patient Position: Sitting   Cuff Size: Adult   Pulse: 62   Resp: 14   Temp: 98 3 °F (36 8 °C)   TempSrc: Tympanic   SpO2: 97%   Weight: 90 7 kg (200 lb)   Height: 5' 10" (1 778 m)     Body mass index is 28 7 kg/m²  Physical Exam  Vitals and nursing note reviewed  Constitutional:       General: He is not in acute distress  Appearance: Normal appearance  HENT:      Head: Normocephalic and atraumatic        Right Ear: Ear canal and external ear normal  Left Ear: Ear canal and external ear normal       Nose: Nose normal       Mouth/Throat:      Mouth: Mucous membranes are moist    Eyes:      General: No scleral icterus  Right eye: No discharge  Left eye: No discharge  Extraocular Movements: Extraocular movements intact  Conjunctiva/sclera: Conjunctivae normal    Cardiovascular:      Rate and Rhythm: Normal rate and regular rhythm  Pulses: Normal pulses  Heart sounds: No murmur heard  Pulmonary:      Effort: Pulmonary effort is normal  No respiratory distress  Breath sounds: Normal breath sounds  Abdominal:      General: Bowel sounds are normal  There is no distension  Palpations: Abdomen is soft  Tenderness: There is no abdominal tenderness  Musculoskeletal:         General: Normal range of motion  Cervical back: Normal range of motion and neck supple  No muscular tenderness  Right lower leg: No edema  Left lower leg: No edema  Skin:     General: Skin is warm  Findings: No rash  Neurological:      General: No focal deficit present  Mental Status: He is alert and oriented to person, place, and time  Motor: No weakness        Coordination: Coordination normal    Psychiatric:         Mood and Affect: Mood normal          Behavior: Behavior normal

## 2022-09-13 NOTE — ASSESSMENT & PLAN NOTE
His hemoglobin was 14 9 March 2022 and now decreased to 13 0  Patient denies any GI symptoms except diarrhea  Will check anemia workup with follow up CBC

## 2022-09-13 NOTE — ASSESSMENT & PLAN NOTE
Well controlled on diet    Cholesterol 148, triglyceride 106, HDL 45, LDL 83 so advised to continue low-cholesterol low saturated diet

## 2022-09-13 NOTE — PATIENT INSTRUCTIONS
Patient was advised to continue present medications  discussed with the patient medications and laboratory data in detail  Follow-up with me  as advised  If any blood test was ordered please do 1 week prior to next appointment unless advise to get earlier    If you have any questions please call the office [phone]

## 2022-09-13 NOTE — ASSESSMENT & PLAN NOTE
Liver enzymes back to normal   Has a slightly elevated alkaline phosphatase  Patient has been followed by GI specialist   Has appointment to see GI specialist on September 21, 2022

## 2022-09-13 NOTE — ASSESSMENT & PLAN NOTE
Patient took some Imodium with some improvement in diarrhea  Has about 3-4 bowel movements per day but now is getting more softer than liquid  Campylobacter positive started on azithromycin yesterday  Patient to see GI specialist September 21, 2022

## 2022-09-13 NOTE — ASSESSMENT & PLAN NOTE
Last vitamin-D levels 22  He has been taking vitamin-D 5000 International Units daily  Will follow vitamin-D level and advise accordingly

## 2022-09-13 NOTE — ASSESSMENT & PLAN NOTE
Patient complain of feeling sometime tired  Has new onset of anemia  Denies any blood in the stool or dark colored stool  Patient  would like to check  testosterone level  will order anemia workup and follow CBC as well

## 2022-09-21 ENCOUNTER — OFFICE VISIT (OUTPATIENT)
Dept: GASTROENTEROLOGY | Facility: CLINIC | Age: 42
End: 2022-09-21
Payer: COMMERCIAL

## 2022-09-21 ENCOUNTER — TELEPHONE (OUTPATIENT)
Dept: GASTROENTEROLOGY | Facility: CLINIC | Age: 42
End: 2022-09-21

## 2022-09-21 VITALS
WEIGHT: 200 LBS | HEART RATE: 66 BPM | BODY MASS INDEX: 28 KG/M2 | HEIGHT: 71 IN | SYSTOLIC BLOOD PRESSURE: 133 MMHG | DIASTOLIC BLOOD PRESSURE: 87 MMHG

## 2022-09-21 DIAGNOSIS — A04.5 CAMPYLOBACTER DIARRHEA: Primary | ICD-10-CM

## 2022-09-21 DIAGNOSIS — R79.89 ELEVATED LFTS: ICD-10-CM

## 2022-09-21 DIAGNOSIS — K80.20 GALLSTONES: ICD-10-CM

## 2022-09-21 DIAGNOSIS — K76.0 FATTY LIVER: ICD-10-CM

## 2022-09-21 DIAGNOSIS — K21.9 GASTROESOPHAGEAL REFLUX DISEASE, UNSPECIFIED WHETHER ESOPHAGITIS PRESENT: ICD-10-CM

## 2022-09-21 DIAGNOSIS — R76.8 POSITIVE ANA (ANTINUCLEAR ANTIBODY): ICD-10-CM

## 2022-09-21 DIAGNOSIS — Z80.0 FAMILY HISTORY OF COLON CANCER: ICD-10-CM

## 2022-09-21 DIAGNOSIS — E88.01 ALPHA-1-ANTITRYPSIN DEFICIENCY (HCC): ICD-10-CM

## 2022-09-21 DIAGNOSIS — Z83.71 FAMILY HISTORY OF COLONIC POLYPS: ICD-10-CM

## 2022-09-21 PROCEDURE — 99214 OFFICE O/P EST MOD 30 MIN: CPT | Performed by: NURSE PRACTITIONER

## 2022-09-21 RX ORDER — POLYETHYLENE GLYCOL 3350, SODIUM SULFATE ANHYDROUS, SODIUM BICARBONATE, SODIUM CHLORIDE, POTASSIUM CHLORIDE 236; 22.74; 6.74; 5.86; 2.97 G/4L; G/4L; G/4L; G/4L; G/4L
4000 POWDER, FOR SOLUTION ORAL ONCE
Qty: 4000 ML | Refills: 0 | Status: SHIPPED | OUTPATIENT
Start: 2022-09-21 | End: 2022-09-21

## 2022-09-21 NOTE — PATIENT INSTRUCTIONS
Low Fat Diet   AMBULATORY CARE:   A low-fat diet  is an eating plan that is low in total fat, unhealthy fat, and cholesterol  You may need to follow a low-fat diet if you have trouble digesting or absorbing fat  You may also need to follow this diet if you have high cholesterol  You can also lower your cholesterol by increasing the amount of fiber in your diet  Soluble fiber is a type of fiber that helps to decrease cholesterol levels  Different types of fat in food:   Limit unhealthy fats  A diet that is high in cholesterol, saturated fat, and trans fat may cause unhealthy cholesterol levels  Unhealthy cholesterol levels increase your risk of heart disease  Cholesterol:  Limit intake of cholesterol to less than 200 mg per day  Cholesterol is found in meat, eggs, and dairy  Saturated fat:  Limit saturated fat to less than 7% of your total daily calories  Ask your dietitian how many calories you need each day  Saturated fat is found in butter, cheese, ice cream, whole milk, and palm oil  Saturated fat is also found in meat, such as beef, pork, chicken skin, and processed meats  Processed meats include sausage, hot dogs, and bologna  Trans fat:  Avoid trans fat as much as possible  Trans fat is used in fried and baked foods  Foods that say trans fat free on the label may still have up to 0 5 grams of trans fat per serving  Include healthy fats  Replace foods that are high in saturated and trans fat with foods high in healthy fats  This may help to decrease high cholesterol levels  Monounsaturated fats: These are found in avocados, nuts, and vegetable oils, such as olive, canola, and sunflower oil  Polyunsaturated fats: These can be found in vegetable oils, such as soybean or corn oil  Omega-3 fats can help to decrease the risk of heart disease  Omega-3 fats are found in fish, such as salmon, herring, trout, and tuna   Omega-3 fats can also be found in plant foods, such as walnuts, flaxseed, soybeans, and canola oil  Foods to limit or avoid:   Grains:      Snacks that are made with partially hydrogenated oils, such as chips, regular crackers, and butter-flavored popcorn    High-fat baked goods, such as biscuits, croissants, doughnuts, pies, cookies, and pastries    Dairy:      Whole milk, 2% milk, and yogurt and ice cream made with whole milk    Half and half creamer, heavy cream, and whipping cream    Cheese, cream cheese, and sour cream    Meats and proteins:      High-fat cuts of meat (T-bone steak, regular hamburger, and ribs)    [employer], poultry (turkey and chicken), and fish    Poultry (chicken and turkey) with skin    Cold cuts (salami or bologna), hot dogs, sherman, and sausage    Whole eggs and egg yolks    Vegetables and fruits with added fat:      Fried vegetables or vegetables in butter or high-fat sauces, such as cream or cheese sauces    Fried fruit or fruit served with butter or cream    Fats:      Butter, stick margarine, and shortening    Coconut, palm oil, and palm kernel oil    Foods to include:   Grains:      Whole-grain breads, cereals, pasta, and brown rice    Low-fat crackers and pretzels    Vegetables and fruits:      Fresh, frozen, or canned vegetables (no salt or low-sodium)    Fresh, frozen, dried, or canned fruit (canned in light syrup or fruit juice)    Avocado    Low-fat dairy products:      Nonfat (skim) or 1% milk    Nonfat or low-fat cheese, yogurt, and cottage cheese    Meats and proteins:      Chicken or turkey with no skin    Baked or broiled fish    Lean beef and pork (loin, round, extra lean hamburger)    Beans and peas, unsalted nuts, soy products    Egg whites and substitutes    Seeds and nuts    Fats:      Unsaturated oil, such as canola, olive, peanut, soybean, or sunflower oil    Soft or liquid margarine and vegetable oil spread    Low-fat salad dressing    Other ways to decrease fat:   Read food labels before you buy foods    Choose foods that have less than 30% of calories from fat  Choose low-fat or fat-free dairy products  Remember that fat free does not mean calorie free  These foods still contain calories, and too many calories can lead to weight gain  Trim fat from meat and avoid fried food  Trim all visible fat from meat before you cook it  Remove the skin from poultry  Do not degroot meat, fish, or poultry  Bake, roast, boil, or broil these foods instead  Avoid fried foods  Eat a baked potato instead of [place] fries  Steam vegetables instead of sautéing them in butter  Add less fat to foods  Use imitation sherman bits on salads and baked potatoes instead of regular sherman bits  Use fat-free or low-fat salad dressings instead of regular dressings  Use low-fat or nonfat butter-flavored topping instead of regular butter or margarine on popcorn and other foods  Ways to decrease fat in recipes:  Replace high-fat ingredients with low-fat or nonfat ones  This may cause baked goods to be drier than usual  You may need to use nonfat cooking spray on pans to prevent food from sticking  You also may need to change the amount of other ingredients, such as water, in the recipe  Try the following:  Use low-fat or light margarine instead of regular margarine or shortening  Use lean ground turkey breast or chicken, or lean ground beef (less than 5% fat) instead of hamburger  Add 1 teaspoon of canola oil to 8 ounces of skim milk instead of using cream or half and half  Use grated zucchini, carrots, or apples in breads instead of coconut  Use blenderized, low-fat cottage cheese, plain tofu, or low-fat ricotta cheese instead of cream cheese  Use 1 egg white and 1 teaspoon of canola oil, or use ¼ cup (2 ounces) of fat-free egg substitute instead of a whole egg  Replace half of the oil that is called for in a recipe with applesauce when you bake   Use 3 tablespoons of cocoa powder and 1 tablespoon of canola oil instead of a square of baking chocolate  How to increase fiber:  Eat enough high-fiber foods to get 20 to 30 grams of fiber every day  Slowly increase your fiber intake to avoid stomach cramps, gas, and other problems  Eat 3 ounces of whole-grain foods each day  An ounce is about 1 slice of bread  Eat whole-grain breads, such as whole-wheat bread  Whole wheat, whole-wheat flour, or other whole grains should be listed as the first ingredient on the food label  Replace white flour with whole-grain flour or use half of each in recipes  Whole-grain flour is heavier than white flour, so you may have to add more yeast or baking powder  Eat a high-fiber cereal for breakfast   Oatmeal is a good source of soluble fiber  Look for cereals that have bran or fiber in the name  Choose whole-grain products, such as brown rice, barley, and whole-wheat pasta  Eat more beans, peas, and lentils  For example, add beans to soups or salads  Eat at least 5 cups of fruits and vegetables each day  Eat fruits and vegetables with the peel because the peel is high in fiber  © Copyright [employer] 2022 Information is for End User's use only and may not be sold, redistributed or otherwise used for commercial purposes  All illustrations and images included in CareNotes® are the copyrighted property of A D A M , Inc  or Bart Johns  The above information is an  only  It is not intended as medical advice for individual conditions or treatments  Talk to your doctor, nurse or pharmacist before following any medical regimen to see if it is safe and effective for you
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
## 2022-09-21 NOTE — TELEPHONE ENCOUNTER
Scheduled date of EGD/colonoscopy (as of today):11/18/22  Physician performing EGD/colonoscopy:Raz  Location of EGD/colonoscopy:[facility]  Desired bowel prep reviewed with patient: GoLytely  Instructions reviewed with patient by:Elizabet FRANCIS  Clearances:  None

## 2022-11-14 ENCOUNTER — TELEPHONE (OUTPATIENT)
Dept: GASTROENTEROLOGY | Facility: CLINIC | Age: 42
End: 2022-11-14

## 2022-11-14 NOTE — TELEPHONE ENCOUNTER
lmom confirming pt's colonoscopy/egd scheduled on 11/18/22 at [facility] with Dr Margarette Bliss  Informed [facility] would be calling 1-2 days prior with the arrival time  Informed of clear liquid diet day prior as well as the bowel cleansing preparation  Informed would need a  the day of the procedure due to being under sedation  I asked pt to please call back if has not received instructions or if has any questions  Advised pt to contact insurance if has any questions regarding coverage for procedure

## 2023-11-21 ENCOUNTER — HOSPITAL ENCOUNTER (EMERGENCY)
Facility: HOSPITAL | Age: 43
Discharge: HOME | End: 2023-11-22
Attending: STUDENT IN AN ORGANIZED HEALTH CARE EDUCATION/TRAINING PROGRAM
Payer: COMMERCIAL

## 2023-11-21 VITALS
HEART RATE: 64 BPM | WEIGHT: 214.6 LBS | OXYGEN SATURATION: 98 % | BODY MASS INDEX: 29.93 KG/M2 | SYSTOLIC BLOOD PRESSURE: 153 MMHG | DIASTOLIC BLOOD PRESSURE: 88 MMHG | TEMPERATURE: 97.1 F | RESPIRATION RATE: 18 BRPM

## 2023-11-21 DIAGNOSIS — T78.40XA ALLERGIC REACTION, INITIAL ENCOUNTER: Primary | ICD-10-CM

## 2023-11-21 PROCEDURE — 96372 THER/PROPH/DIAG INJ SC/IM: CPT

## 2023-11-21 PROCEDURE — 99282 EMERGENCY DEPT VISIT SF MDM: CPT

## 2023-11-21 PROCEDURE — 99291 CRITICAL CARE FIRST HOUR: CPT | Performed by: STUDENT IN AN ORGANIZED HEALTH CARE EDUCATION/TRAINING PROGRAM

## 2023-11-21 RX ORDER — PREDNISONE 20 MG/1
60 TABLET ORAL ONCE
Status: COMPLETED | OUTPATIENT
Start: 2023-11-21 | End: 2023-11-21

## 2023-11-21 RX ORDER — DIPHENHYDRAMINE HCL 25 MG
25 TABLET ORAL ONCE
Status: COMPLETED | OUTPATIENT
Start: 2023-11-21 | End: 2023-11-21

## 2023-11-21 RX ORDER — EPINEPHRINE 0.3 MG/.3ML
0.3 INJECTION SUBCUTANEOUS AS NEEDED
Qty: 0.6 ML | Refills: 0 | Status: SHIPPED | OUTPATIENT
Start: 2023-11-21

## 2023-11-21 RX ORDER — PREDNISONE 20 MG/1
40 TABLET ORAL DAILY
Qty: 8 TABLET | Refills: 0 | Status: SHIPPED | OUTPATIENT
Start: 2023-11-22 | End: 2023-11-26

## 2023-11-21 RX ORDER — FAMOTIDINE 20 MG/1
20 TABLET, FILM COATED ORAL ONCE
Status: COMPLETED | OUTPATIENT
Start: 2023-11-21 | End: 2023-11-21

## 2023-11-21 RX ORDER — EPINEPHRINE 1 MG/ML
0.5 INJECTION, SOLUTION, CONCENTRATE INTRAVENOUS ONCE
Status: COMPLETED | OUTPATIENT
Start: 2023-11-21 | End: 2023-11-21

## 2023-11-21 RX ADMIN — DIPHENHYDRAMINE HYDROCHLORIDE 25 MG: 25 TABLET ORAL at 21:56

## 2023-11-21 RX ADMIN — FAMOTIDINE 20 MG: 20 TABLET ORAL at 21:56

## 2023-11-21 RX ADMIN — EPINEPHRINE 0.5 MG: 1 INJECTION, SOLUTION, CONCENTRATE INTRAVENOUS at 22:24

## 2023-11-21 RX ADMIN — PREDNISONE 60 MG: 20 TABLET ORAL at 21:56

## 2023-11-22 ENCOUNTER — OFFICE VISIT (OUTPATIENT)
Dept: INTERNAL MEDICINE CLINIC | Facility: CLINIC | Age: 43
End: 2023-11-22
Payer: COMMERCIAL

## 2023-11-22 VITALS
TEMPERATURE: 98 F | HEIGHT: 71 IN | OXYGEN SATURATION: 97 % | BODY MASS INDEX: 29.82 KG/M2 | DIASTOLIC BLOOD PRESSURE: 76 MMHG | SYSTOLIC BLOOD PRESSURE: 130 MMHG | HEART RATE: 88 BPM | WEIGHT: 213 LBS

## 2023-11-22 DIAGNOSIS — K76.0 FATTY LIVER: ICD-10-CM

## 2023-11-22 DIAGNOSIS — Z13.6 SCREENING FOR CARDIOVASCULAR CONDITION: ICD-10-CM

## 2023-11-22 DIAGNOSIS — E78.2 MIXED HYPERLIPIDEMIA: ICD-10-CM

## 2023-11-22 DIAGNOSIS — L50.9 URTICARIA: Primary | ICD-10-CM

## 2023-11-22 DIAGNOSIS — Z13.0 SCREENING FOR DEFICIENCY ANEMIA: ICD-10-CM

## 2023-11-22 DIAGNOSIS — E55.9 VITAMIN D DEFICIENCY: ICD-10-CM

## 2023-11-22 DIAGNOSIS — I10 HYPERTENSION, UNSPECIFIED TYPE: ICD-10-CM

## 2023-11-22 DIAGNOSIS — E74.39 GLUCOSE INTOLERANCE: ICD-10-CM

## 2023-11-22 PROCEDURE — 99213 OFFICE O/P EST LOW 20 MIN: CPT | Performed by: INTERNAL MEDICINE
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
## 2023-11-22 NOTE — ASSESSMENT & PLAN NOTE
Continue low-fat low-cholesterol diet awaiting lipid profile to be seen by Dr. Ed Rivera for physical

## 2023-11-22 NOTE — ASSESSMENT & PLAN NOTE
Develop erythematous maculopapular rash over the trunk or lower extremity with some itching erythematous size varying from 1 mm to 5 cm in size treated with tapering dose of steroid Benadryl EpiPen and Pepcid all resolved recommended to continue Pepcid for prevention on daily basis and Benadryl as needed if any recurrence although patient chooses to see allergist referral given
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
## 2024-01-17 ENCOUNTER — TELEPHONE (OUTPATIENT)
Dept: INTERNAL MEDICINE CLINIC | Facility: CLINIC | Age: 44
End: 2024-01-17

## 2024-02-06 ENCOUNTER — APPOINTMENT (OUTPATIENT)
Dept: RADIOLOGY | Facility: CLINIC | Age: 44
End: 2024-02-06
Payer: COMMERCIAL

## 2024-02-06 ENCOUNTER — OFFICE VISIT (OUTPATIENT)
Dept: OBGYN CLINIC | Facility: CLINIC | Age: 44
End: 2024-02-06
Payer: COMMERCIAL

## 2024-02-06 VITALS
HEIGHT: 71 IN | DIASTOLIC BLOOD PRESSURE: 88 MMHG | HEART RATE: 59 BPM | BODY MASS INDEX: 29.82 KG/M2 | WEIGHT: 213 LBS | SYSTOLIC BLOOD PRESSURE: 145 MMHG

## 2024-02-06 DIAGNOSIS — M25.551 PAIN IN RIGHT HIP: ICD-10-CM

## 2024-02-06 DIAGNOSIS — M70.61 GREATER TROCHANTERIC BURSITIS OF RIGHT HIP: Primary | ICD-10-CM

## 2024-02-06 PROCEDURE — 73502 X-RAY EXAM HIP UNI 2-3 VIEWS: CPT

## 2024-02-06 PROCEDURE — 99203 OFFICE O/P NEW LOW 30 MIN: CPT | Performed by: ORTHOPAEDIC SURGERY
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
## 2024-05-13 ENCOUNTER — TELEPHONE (OUTPATIENT)
Age: 44
End: 2024-05-13

## 2024-05-13 NOTE — TELEPHONE ENCOUNTER
LVM- patient last seen by Dr. Wright 9/13/2022. He needs to schedule an appointment with our office.

## 2024-05-13 NOTE — TELEPHONE ENCOUNTER
Pt called to request a referral to endocrinology; pt feels his testosterone is low and wants to get checked out    Referral to:    Dr Saxena  [address], [address]  [place] [address]    Fax # [phone]     Please advise

## 2024-09-30 ENCOUNTER — TELEPHONE (OUTPATIENT)
Dept: OBGYN CLINIC | Facility: CLINIC | Age: 44
End: 2024-09-30

## 2024-09-30 NOTE — TELEPHONE ENCOUNTER
Lvm to cb regarding what body part he is coming in for so we can make sure he is seeing the correct provider. If it is hand/wrist/elbow okay to stay with Mary Any shoulder pain should see Yg Hwang or Sonya

## 2024-10-02 ENCOUNTER — APPOINTMENT (OUTPATIENT)
Dept: RADIOLOGY | Facility: CLINIC | Age: 44
End: 2024-10-02
Payer: COMMERCIAL

## 2024-10-02 ENCOUNTER — OFFICE VISIT (OUTPATIENT)
Dept: OBGYN CLINIC | Facility: CLINIC | Age: 44
End: 2024-10-02
Payer: COMMERCIAL

## 2024-10-02 VITALS
DIASTOLIC BLOOD PRESSURE: 101 MMHG | BODY MASS INDEX: 29.82 KG/M2 | HEIGHT: 71 IN | WEIGHT: 213 LBS | SYSTOLIC BLOOD PRESSURE: 154 MMHG | HEART RATE: 78 BPM

## 2024-10-02 DIAGNOSIS — M75.21 BICEPS TENDINITIS OF RIGHT UPPER EXTREMITY: Primary | ICD-10-CM

## 2024-10-02 DIAGNOSIS — M79.601 RIGHT ARM PAIN: ICD-10-CM

## 2024-10-02 PROCEDURE — 99214 OFFICE O/P EST MOD 30 MIN: CPT | Performed by: ORTHOPAEDIC SURGERY

## 2024-10-02 PROCEDURE — 73030 X-RAY EXAM OF SHOULDER: CPT

## 2024-10-02 PROCEDURE — 73080 X-RAY EXAM OF ELBOW: CPT

## 2024-10-02 RX ORDER — MELOXICAM 15 MG/1
15 TABLET ORAL DAILY
Qty: 30 TABLET | Refills: 1 | Status: SHIPPED | OUTPATIENT
Start: 2024-10-02
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
## 2024-10-09 ENCOUNTER — TELEPHONE (OUTPATIENT)
Age: 44
End: 2024-10-09
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
## 2024-11-13 ENCOUNTER — OFFICE VISIT (OUTPATIENT)
Dept: OBGYN CLINIC | Facility: CLINIC | Age: 44
End: 2024-11-13
Payer: COMMERCIAL

## 2024-11-13 VITALS
WEIGHT: 213 LBS | DIASTOLIC BLOOD PRESSURE: 104 MMHG | SYSTOLIC BLOOD PRESSURE: 171 MMHG | HEIGHT: 71 IN | BODY MASS INDEX: 29.82 KG/M2 | HEART RATE: 68 BPM

## 2024-11-13 DIAGNOSIS — M75.21 BICEPS TENDINITIS OF RIGHT UPPER EXTREMITY: ICD-10-CM

## 2024-11-13 DIAGNOSIS — M24.811 INTERNAL DERANGEMENT OF RIGHT SHOULDER: Primary | ICD-10-CM

## 2024-11-13 PROCEDURE — 99214 OFFICE O/P EST MOD 30 MIN: CPT | Performed by: ORTHOPAEDIC SURGERY
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
## 2024-11-14 DIAGNOSIS — Z00.6 ENCOUNTER FOR EXAMINATION FOR NORMAL COMPARISON OR CONTROL IN CLINICAL RESEARCH PROGRAM: ICD-10-CM

## 2024-12-03 ENCOUNTER — HOSPITAL ENCOUNTER (OUTPATIENT)
Dept: RADIOLOGY | Facility: HOSPITAL | Age: 44
Discharge: HOME | End: 2024-12-03
Payer: COMMERCIAL

## 2024-12-03 DIAGNOSIS — M24.811 INTERNAL DERANGEMENT OF RIGHT SHOULDER: ICD-10-CM

## 2024-12-03 PROCEDURE — A9585 GADOBUTROL INJECTION: HCPCS | Performed by: STUDENT IN AN ORGANIZED HEALTH CARE EDUCATION/TRAINING PROGRAM

## 2024-12-03 PROCEDURE — 77002 NEEDLE LOCALIZATION BY XRAY: CPT

## 2024-12-03 PROCEDURE — 73222 MRI JOINT UPR EXTREM W/DYE: CPT

## 2024-12-03 PROCEDURE — 23350 INJECTION FOR SHOULDER X-RAY: CPT

## 2024-12-03 RX ORDER — GADOBUTROL 604.72 MG/ML
0.2 INJECTION INTRAVENOUS
Status: COMPLETED | OUTPATIENT
Start: 2024-12-03 | End: 2024-12-03

## 2024-12-03 RX ORDER — ROPIVACAINE HYDROCHLORIDE 2 MG/ML
2 INJECTION, SOLUTION EPIDURAL; INFILTRATION; PERINEURAL
Status: COMPLETED | OUTPATIENT
Start: 2024-12-03 | End: 2024-12-03

## 2024-12-03 RX ORDER — LIDOCAINE HYDROCHLORIDE 10 MG/ML
3 INJECTION, SOLUTION EPIDURAL; INFILTRATION; INTRACAUDAL; PERINEURAL
Status: COMPLETED | OUTPATIENT
Start: 2024-12-03 | End: 2024-12-03

## 2024-12-03 RX ADMIN — GADOBUTROL 0.2 ML: 604.72 INJECTION INTRAVENOUS at 11:46

## 2024-12-03 RX ADMIN — LIDOCAINE HYDROCHLORIDE 3 ML: 10 INJECTION, SOLUTION EPIDURAL; INFILTRATION; INTRACAUDAL; PERINEURAL at 11:46

## 2024-12-03 RX ADMIN — IOHEXOL 1 ML: 300 INJECTION, SOLUTION INTRAVENOUS at 11:46

## 2024-12-03 RX ADMIN — ROPIVACAINE HYDROCHLORIDE 2 ML: 2 INJECTION EPIDURAL; INFILTRATION; PERINEURAL at 11:46

## 2024-12-11 ENCOUNTER — OFFICE VISIT (OUTPATIENT)
Dept: OBGYN CLINIC | Facility: CLINIC | Age: 44
End: 2024-12-11
Payer: COMMERCIAL

## 2024-12-11 VITALS
SYSTOLIC BLOOD PRESSURE: 134 MMHG | HEART RATE: 66 BPM | WEIGHT: 216.4 LBS | DIASTOLIC BLOOD PRESSURE: 86 MMHG | HEIGHT: 71 IN | BODY MASS INDEX: 30.3 KG/M2

## 2024-12-11 DIAGNOSIS — S43.431D GLENOID LABRAL TEAR, RIGHT, SUBSEQUENT ENCOUNTER: Primary | ICD-10-CM

## 2024-12-11 DIAGNOSIS — S42.291A HILL SACHS DEFORMITY, RIGHT: ICD-10-CM

## 2024-12-11 DIAGNOSIS — M75.111 INCOMPLETE TEAR OF RIGHT ROTATOR CUFF, UNSPECIFIED WHETHER TRAUMATIC: ICD-10-CM

## 2024-12-11 PROCEDURE — 99214 OFFICE O/P EST MOD 30 MIN: CPT | Performed by: ORTHOPAEDIC SURGERY
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
## 2025-06-12 ENCOUNTER — TELEPHONE (OUTPATIENT)
Age: 45
End: 2025-06-12

## 2025-06-12 NOTE — TELEPHONE ENCOUNTER
Roque called back - declined visit with Dr Carcamo 6/13 - will just keep 6/25 and I have him waitlisted.

## 2025-06-12 NOTE — TELEPHONE ENCOUNTER
Spoke with Roque - he works 2 nd shift - offered Dr Still, not comfortable with female provider.  Gave him Dr Harris's name (urologist down the str from our office)      He said just to keep him on a w/l for a daytime apt

## 2025-06-12 NOTE — TELEPHONE ENCOUNTER
Pt called in stating that he found a lump in his right testicle. Scheduled appointment with pcp for 6/25. Would like to know if blood work can be order in the meantime to check any levels that could cause the lump. Pt tried getting in to see urology as a new patient but cannot be scheduled for months. Please advise.

## 2025-06-23 ENCOUNTER — TELEPHONE (OUTPATIENT)
Dept: INTERNAL MEDICINE CLINIC | Facility: CLINIC | Age: 45
End: 2025-06-23

## 2025-06-25 ENCOUNTER — OFFICE VISIT (OUTPATIENT)
Dept: INTERNAL MEDICINE CLINIC | Facility: CLINIC | Age: 45
End: 2025-06-25
Payer: COMMERCIAL

## 2025-06-25 VITALS
WEIGHT: 220 LBS | DIASTOLIC BLOOD PRESSURE: 94 MMHG | BODY MASS INDEX: 30.8 KG/M2 | TEMPERATURE: 98.2 F | OXYGEN SATURATION: 97 % | SYSTOLIC BLOOD PRESSURE: 154 MMHG | RESPIRATION RATE: 16 BRPM | HEART RATE: 72 BPM | HEIGHT: 71 IN

## 2025-06-25 DIAGNOSIS — N50.89 TESTICULAR LUMP: Primary | ICD-10-CM

## 2025-06-25 DIAGNOSIS — Z83.3 FAMILY HISTORY OF DIABETES MELLITUS: ICD-10-CM

## 2025-06-25 DIAGNOSIS — K76.0 FATTY LIVER: ICD-10-CM

## 2025-06-25 DIAGNOSIS — R53.83 OTHER FATIGUE: ICD-10-CM

## 2025-06-25 DIAGNOSIS — E78.2 MIXED HYPERLIPIDEMIA: ICD-10-CM

## 2025-06-25 DIAGNOSIS — Z13.1 SCREENING FOR DIABETES MELLITUS: ICD-10-CM

## 2025-06-25 DIAGNOSIS — E55.9 VITAMIN D DEFICIENCY: ICD-10-CM

## 2025-06-25 DIAGNOSIS — I10 HYPERTENSION, UNSPECIFIED TYPE: ICD-10-CM

## 2025-06-25 PROCEDURE — 99214 OFFICE O/P EST MOD 30 MIN: CPT | Performed by: INTERNAL MEDICINE

## 2025-06-25 NOTE — PROGRESS NOTES
Name: Roque Fernandez      : 1980      MRN: [id number]  Encounter Provider: Alesia Wright MD  Encounter Date: 2025   Encounter department: [facility]  :  Assessment & Plan  Testicular lump  Has a right side testicular area lump.  History of undescended right testicle.  Will order ultrasound and advise accordingly after ultrasound result.  Orders:  •  US scrotum and testicles; Future  •  CBC and differential; Future  •  Comprehensive metabolic panel; Future  •  Testosterone, free, total; Future    Hypertension, unspecified type  Discussed with the patient about elevated blood pressure.  Will obtain metabolic workup.  Advised for low-salt diet exercise and lose weight.  Advised to monitor blood pressure at home and keep a log.  Will reevaluate in 4 weeks if persistently elevated blood pressure will need to start medication.  Orders:  •  CBC and differential; Future  •  Comprehensive metabolic panel; Future  •  TSH, 3rd generation; Future  •  UA (URINE) with reflex to Scope; Future  •  Metanephrine, Fractionated Plasma Free; Future  •  Renin Activity, Plasma; Future  •  Aldosterone; Future  •  Cortisol Level, AM Specimen; Future    Fatty liver  Has history of fatty liver and elevated liver enzymes for which he was seen by GI specialist in  and  and had extensive workup.  Will check liver enzymes  Orders:  •  Comprehensive metabolic panel; Future    Vitamin D deficiency  History of vitamin D deficiency.  He takes vitamin D 5000 IU tablet once or twice per week.  Will check level and advise accordingly.  Orders:  •  Comprehensive metabolic panel; Future  •  Vitamin D 25 hydroxy; Future    BMI 30.0-30.9,adult  Advised to decrease calorie intake, decrease sugar,carbs intake and exercise to lose weight.  Will check metabolic workup  Orders:  •  TSH, 3rd generation; Future  •  Cortisol Level, AM Specimen; Future    Other fatigue  Patient complain of some time  feels tired.  Will order metabolic workup.  Orders:  •  CBC and differential; Future  •  Comprehensive metabolic panel; Future  •  TSH, 3rd generation; Future  •  UA (URINE) with reflex to Scope; Future  •  Testosterone, free, total; Future    Mixed hyperlipidemia  History of hyperlipidemia controlled on diet.  Last lipid panel on 9/7/2022 revealed total cholesterol 148, triglyceride 106, HDL 45, LDL 83.  Advised to continue low-cholesterol low carbs diet.  Orders:  •  Lipid panel; Future    Screening for diabetes mellitus    Orders:  •  Hemoglobin A1c (w/out EAG); Future    Family history of diabetes mellitus  His brother and father both has a diabetes mellitus  Orders:  •  Hemoglobin A1c (w/out EAG); Future           History of Present Illness   HPI  45-year-old white male patient presents with complaint of lump right testicular area.    Current Medications[1]     Past Medical History[2]     Review of Systems   Constitutional:  Positive for fatigue. Negative for chills and fever.   HENT:  Negative for congestion, ear pain, hearing loss, nosebleeds, sinus pain, sore throat and trouble swallowing.    Eyes:  Negative for discharge, redness and visual disturbance.   Respiratory:  Negative for cough, chest tightness and shortness of breath.    Cardiovascular:  Negative for chest pain and palpitations.   Gastrointestinal:  Negative for abdominal pain, blood in stool, constipation, diarrhea, nausea and vomiting.   Genitourinary:  Negative for dysuria, flank pain, frequency, genital sores, hematuria and penile discharge.        Lump right testicular area pain   Musculoskeletal:  Negative for arthralgias, myalgias and neck pain.   Skin:  Negative for rash.   Neurological:  Negative for dizziness, speech difficulty, weakness and headaches.   Hematological:  Does not bruise/bleed easily.   Psychiatric/Behavioral:  Negative for agitation and behavioral problems.          Objective   /94 (BP Location: Left arm, Patient  "Position: Sitting, Cuff Size: Standard)   Pulse 72   Temp 98.2 °F (36.8 °C) (Temporal)   Resp 16   Ht 5' 11\" (1.803 m)   Wt 99.8 kg (220 lb)   SpO2 97%   BMI 30.68 kg/m²      Physical Exam  Vitals and nursing note reviewed.   Constitutional:       General: He is not in acute distress.     Appearance: He is well-developed. He is obese.   HENT:      Head: Normocephalic and atraumatic.      Right Ear: External ear normal.      Left Ear: External ear normal.      Nose: Nose normal.     Eyes:      General: No scleral icterus.        Right eye: No discharge.         Left eye: No discharge.      Extraocular Movements: Extraocular movements intact.      Conjunctiva/sclera: Conjunctivae normal.       Cardiovascular:      Rate and Rhythm: Normal rate and regular rhythm.      Pulses: Normal pulses.      Heart sounds: No murmur heard.  Pulmonary:      Effort: Pulmonary effort is normal. No respiratory distress.      Breath sounds: Normal breath sounds. No wheezing or rales.   Abdominal:      General: There is no distension.      Palpations: Abdomen is soft.      Tenderness: There is no abdominal tenderness.   Genitourinary:     Penis: Normal.       Comments: On the right side of the testicle area has a lump.  There is no redness or any skin rash noted.  History of right undescended testicle.    Musculoskeletal:         General: No swelling. Normal range of motion.      Cervical back: Normal range of motion and neck supple.      Right lower leg: No edema.      Left lower leg: No edema.     Skin:     General: Skin is warm and dry.      Capillary Refill: Capillary refill takes less than 2 seconds.      Findings: No rash.     Neurological:      General: No focal deficit present.      Mental Status: He is alert and oriented to person, place, and time.     Psychiatric:         Mood and Affect: Mood normal.         Behavior: Behavior normal.                [1]    Current Outpatient Medications:   •  cholecalciferol (VITAMIN D3) " 1,000 units tablet, Take 5,000 Units by mouth daily Pt takes 5,000 iu   (Patient taking differently: Take 5,000 Units by mouth 2 (two) times a week Pt takes 5,000 iu), Disp: , Rfl: [2]  Past Medical History:  Diagnosis Date   • BMI 28.0-28.9,adult 10/08/2021   • BMI 29.0-29.9,adult 03/08/2022   • BMI 30.0-30.9,adult 06/25/2025   • Chest pain    • Diarrhea 09/06/2022   • Elevated liver enzymes 03/20/2021   • Fatigue 10/08/2021   • GE reflux    • HTN (hypertension) 03/20/2021   • Hyperlipidemia 03/20/2021   • Hypertension    • Loud snoring 03/20/2021   • Other fatigue 09/13/2022   • Other specified anemias 09/13/2022   • Skin lesion    • Testicular lump 06/25/2025   • Vitamin D deficiency 09/06/2022

## 2025-06-25 NOTE — ASSESSMENT & PLAN NOTE
Discussed with the patient about elevated blood pressure.  Will obtain metabolic workup.  Advised for low-salt diet exercise and lose weight.  Advised to monitor blood pressure at home and keep a log.  Will reevaluate in 4 weeks if persistently elevated blood pressure will need to start medication.  Orders:  •  CBC and differential; Future  •  Comprehensive metabolic panel; Future  •  TSH, 3rd generation; Future  •  UA (URINE) with reflex to Scope; Future  •  Metanephrine, Fractionated Plasma Free; Future  •  Renin Activity, Plasma; Future  •  Aldosterone; Future  •  Cortisol Level, AM Specimen; Future

## 2025-06-25 NOTE — ASSESSMENT & PLAN NOTE
History of hyperlipidemia controlled on diet.  Last lipid panel on 9/7/2022 revealed total cholesterol 148, triglyceride 106, HDL 45, LDL 83.  Advised to continue low-cholesterol low carbs diet.  Orders:  •  Lipid panel; Future

## 2025-06-25 NOTE — ASSESSMENT & PLAN NOTE
Has history of fatty liver and elevated liver enzymes for which he was seen by GI specialist in 2021 and 2022 and had extensive workup.  Will check liver enzymes  Orders:  •  Comprehensive metabolic panel; Future

## 2025-06-25 NOTE — PATIENT INSTRUCTIONS
Patient was advised to continue present medications. discussed with the patient medications and laboratory data in detail.  Follow-up with me in 4 weeks or as advised.  If any blood test was ordered please do 1 week prior to next appointment unless advise to get earlier.  If you have any questions please call the office [phone]

## 2025-06-25 NOTE — ASSESSMENT & PLAN NOTE
History of vitamin D deficiency.  He takes vitamin D 5000 IU tablet once or twice per week.  Will check level and advise accordingly.  Orders:  •  Comprehensive metabolic panel; Future  •  Vitamin D 25 hydroxy; Future

## 2025-06-25 NOTE — ASSESSMENT & PLAN NOTE
Patient complain of some time feels tired.  Will order metabolic workup.  Orders:  •  CBC and differential; Future  •  Comprehensive metabolic panel; Future  •  TSH, 3rd generation; Future  •  UA (URINE) with reflex to Scope; Future  •  Testosterone, free, total; Future

## 2025-06-25 NOTE — ASSESSMENT & PLAN NOTE
Has a right side testicular area lump.  History of undescended right testicle.  Will order ultrasound and advise accordingly after ultrasound result.  Orders:  •  US scrotum and testicles; Future  •  CBC and differential; Future  •  Comprehensive metabolic panel; Future  •  Testosterone, free, total; Future

## 2025-06-25 NOTE — ASSESSMENT & PLAN NOTE
Advised to decrease calorie intake, decrease sugar,carbs intake and exercise to lose weight.  Will check metabolic workup  Orders:  •  TSH, 3rd generation; Future  •  Cortisol Level, AM Specimen; Future

## 2025-06-27 ENCOUNTER — HOSPITAL ENCOUNTER (OUTPATIENT)
Dept: RADIOLOGY | Facility: HOSPITAL | Age: 45
Discharge: HOME | End: 2025-06-27
Payer: COMMERCIAL

## 2025-06-27 DIAGNOSIS — N50.89 TESTICULAR LUMP: ICD-10-CM

## 2025-06-27 PROCEDURE — 76870 US EXAM SCROTUM: CPT

## 2025-07-07 ENCOUNTER — RESULTS FOLLOW-UP (OUTPATIENT)
Dept: INTERNAL MEDICINE CLINIC | Facility: CLINIC | Age: 45
End: 2025-07-07

## 2025-07-30 ENCOUNTER — OFFICE VISIT (OUTPATIENT)
Dept: INTERNAL MEDICINE CLINIC | Facility: CLINIC | Age: 45
End: 2025-07-30
Payer: COMMERCIAL

## 2025-07-30 VITALS
OXYGEN SATURATION: 99 % | RESPIRATION RATE: 16 BRPM | HEIGHT: 71 IN | TEMPERATURE: 98.2 F | SYSTOLIC BLOOD PRESSURE: 150 MMHG | DIASTOLIC BLOOD PRESSURE: 94 MMHG | HEART RATE: 78 BPM | WEIGHT: 220 LBS | BODY MASS INDEX: 30.8 KG/M2

## 2025-07-30 DIAGNOSIS — L98.9 SKIN LESIONS: ICD-10-CM

## 2025-07-30 DIAGNOSIS — G89.29 CHRONIC RIGHT SHOULDER PAIN: ICD-10-CM

## 2025-07-30 DIAGNOSIS — Z12.11 SCREENING FOR COLORECTAL CANCER: ICD-10-CM

## 2025-07-30 DIAGNOSIS — N50.89 TESTICULAR LUMP: ICD-10-CM

## 2025-07-30 DIAGNOSIS — Z12.12 SCREENING FOR COLORECTAL CANCER: ICD-10-CM

## 2025-07-30 DIAGNOSIS — I10 HYPERTENSION, UNSPECIFIED TYPE: ICD-10-CM

## 2025-07-30 DIAGNOSIS — Z00.00 ANNUAL PHYSICAL EXAM: Primary | ICD-10-CM

## 2025-07-30 DIAGNOSIS — M25.511 CHRONIC RIGHT SHOULDER PAIN: ICD-10-CM

## 2025-07-30 PROCEDURE — 99396 PREV VISIT EST AGE 40-64: CPT | Performed by: INTERNAL MEDICINE

## 2025-07-30 RX ORDER — AMLODIPINE BESYLATE 5 MG/1
5 TABLET ORAL DAILY
Qty: 30 TABLET | Refills: 1 | Status: SHIPPED | OUTPATIENT
Start: 2025-07-30

## 2025-08-07 LAB — HBA1C MFR BLD: 5.8 %

## 2025-08-12 ENCOUNTER — TELEPHONE (OUTPATIENT)
Age: 45
End: 2025-08-12

## 2025-08-13 LAB
25(OH)D3 SERPL-MCNC: 29 NG/ML (ref 30–100)
ALBUMIN SERPL-MCNC: 4.6 G/DL (ref 3.6–5.1)
ALBUMIN/GLOB SERPL: 1.7 (CALC) (ref 1–2.5)
ALDOST SERPL-MCNC: 6 NG/DL
ALP SERPL-CCNC: 87 U/L (ref 36–130)
ALT SERPL-CCNC: 61 U/L (ref 9–46)
APPEARANCE UR: CLEAR
AST SERPL-CCNC: 40 U/L (ref 10–40)
BASOPHILS # BLD AUTO: 42 CELLS/UL (ref 0–200)
BASOPHILS NFR BLD AUTO: 0.7 %
BILIRUB SERPL-MCNC: 0.8 MG/DL (ref 0.2–1.2)
BILIRUB UR QL STRIP: NEGATIVE
BUN SERPL-MCNC: 15 MG/DL (ref 7–25)
BUN/CREAT SERPL: ABNORMAL (CALC) (ref 6–22)
CALCIUM SERPL-MCNC: 9.5 MG/DL (ref 8.6–10.3)
CHLORIDE SERPL-SCNC: 102 MMOL/L (ref 98–110)
CHOLEST SERPL-MCNC: 190 MG/DL
CHOLEST/HDLC SERPL: 3.4 (CALC)
CO2 SERPL-SCNC: 29 MMOL/L (ref 20–32)
COLOR UR: YELLOW
CORTIS AM PEAK SERPL-MCNC: 5.9 MCG/DL
CREAT SERPL-MCNC: 1.03 MG/DL (ref 0.6–1.29)
EOSINOPHIL # BLD AUTO: 174 CELLS/UL (ref 15–500)
EOSINOPHIL NFR BLD AUTO: 2.9 %
ERYTHROCYTE [DISTWIDTH] IN BLOOD BY AUTOMATED COUNT: 12.6 % (ref 11–15)
GFR/BSA.PRED SERPLBLD CYS-BASED-ARV: 91 ML/MIN/1.73M2
GLOBULIN SER CALC-MCNC: 2.7 G/DL (CALC) (ref 1.9–3.7)
GLUCOSE SERPL-MCNC: 94 MG/DL (ref 65–99)
GLUCOSE UR QL STRIP: NEGATIVE
HCT VFR BLD AUTO: 43.9 % (ref 38.5–50)
HDLC SERPL-MCNC: 56 MG/DL
HGB BLD-MCNC: 14.8 G/DL (ref 13.2–17.1)
HGB UR QL STRIP: NEGATIVE
KETONES UR QL STRIP: NEGATIVE
LDLC SERPL CALC-MCNC: 112 MG/DL (CALC)
LEUKOCYTE ESTERASE UR QL STRIP: NEGATIVE
LYMPHOCYTES # BLD AUTO: 1812 CELLS/UL (ref 850–3900)
LYMPHOCYTES NFR BLD AUTO: 30.2 %
MCH RBC QN AUTO: 31.6 PG (ref 27–33)
MCHC RBC AUTO-ENTMCNC: 33.7 G/DL (ref 32–36)
MCV RBC AUTO: 93.6 FL (ref 80–100)
METANEPH FREE SERPL-MCNC: <25 PG/ML
METANEPHS SERPL-MCNC: 43 PG/ML
MONOCYTES # BLD AUTO: 546 CELLS/UL (ref 200–950)
MONOCYTES NFR BLD AUTO: 9.1 %
NEUTROPHILS # BLD AUTO: 3426 CELLS/UL (ref 1500–7800)
NEUTROPHILS NFR BLD AUTO: 57.1 %
NITRITE UR QL STRIP: NEGATIVE
NONHDLC SERPL-MCNC: 134 MG/DL (CALC)
NORMETANEPH FREE SERPL-MCNC: 43 PG/ML
PH UR STRIP: 5.5 [PH] (ref 5–8)
PLATELET # BLD AUTO: 229 THOUSAND/UL (ref 140–400)
PMV BLD REES-ECKER: 10 FL (ref 7.5–12.5)
POTASSIUM SERPL-SCNC: 4.6 MMOL/L (ref 3.5–5.3)
PROT SERPL-MCNC: 7.3 G/DL (ref 6.1–8.1)
PROT UR QL STRIP: NEGATIVE
RBC # BLD AUTO: 4.69 MILLION/UL (ref 4.2–5.8)
RENIN PLAS-CCNC: 1.24 NG/ML/H (ref 0.25–5.82)
SODIUM SERPL-SCNC: 139 MMOL/L (ref 135–146)
SP GR UR STRIP: 1.01 (ref 1–1.03)
TESTOST FREE SERPL-MCNC: 44.5 PG/ML (ref 35–155)
TESTOST SERPL-MCNC: 257 NG/DL (ref 250–1100)
TRIGL SERPL-MCNC: 108 MG/DL
TSH SERPL-ACNC: 1.16 MIU/L (ref 0.4–4.5)
WBC # BLD AUTO: 6 THOUSAND/UL (ref 3.8–10.8)